# Patient Record
Sex: FEMALE | Race: WHITE | NOT HISPANIC OR LATINO | Employment: FULL TIME | URBAN - METROPOLITAN AREA
[De-identification: names, ages, dates, MRNs, and addresses within clinical notes are randomized per-mention and may not be internally consistent; named-entity substitution may affect disease eponyms.]

---

## 2017-10-19 ENCOUNTER — NON-PROVIDER VISIT (OUTPATIENT)
Dept: OCCUPATIONAL MEDICINE | Facility: CLINIC | Age: 22
End: 2017-10-19

## 2017-10-19 DIAGNOSIS — Z02.1 PRE-EMPLOYMENT DRUG SCREENING: ICD-10-CM

## 2017-10-19 LAB
AMP AMPHETAMINE: NORMAL
COC COCAINE: NORMAL
INT CON NEG: NORMAL
INT CON POS: NORMAL
MET METHAMPHETAMINES: NORMAL
OPI OPIATES: NORMAL
PCP PHENCYCLIDINE: NORMAL
POC DRUG COMMENT 753798-OCCUPATIONAL HEALTH: NORMAL
THC: NORMAL

## 2017-10-19 PROCEDURE — 8911 PR MRO FEE: Performed by: INTERNAL MEDICINE

## 2017-10-19 PROCEDURE — 80305 DRUG TEST PRSMV DIR OPT OBS: CPT | Performed by: PREVENTIVE MEDICINE

## 2017-12-19 ENCOUNTER — APPOINTMENT (OUTPATIENT)
Dept: RADIOLOGY | Facility: IMAGING CENTER | Age: 22
End: 2017-12-19
Attending: PREVENTIVE MEDICINE
Payer: COMMERCIAL

## 2017-12-19 ENCOUNTER — OCCUPATIONAL MEDICINE (OUTPATIENT)
Dept: OCCUPATIONAL MEDICINE | Facility: CLINIC | Age: 22
End: 2017-12-19
Payer: COMMERCIAL

## 2017-12-19 VITALS
BODY MASS INDEX: 24.33 KG/M2 | HEART RATE: 103 BPM | RESPIRATION RATE: 16 BRPM | OXYGEN SATURATION: 100 % | WEIGHT: 155 LBS | SYSTOLIC BLOOD PRESSURE: 134 MMHG | DIASTOLIC BLOOD PRESSURE: 97 MMHG | HEIGHT: 67 IN | TEMPERATURE: 97.8 F

## 2017-12-19 DIAGNOSIS — N92.6 LATE MENSES: ICD-10-CM

## 2017-12-19 DIAGNOSIS — S63.690A: ICD-10-CM

## 2017-12-19 DIAGNOSIS — S63.91XA SPRAIN OF RIGHT HAND, INITIAL ENCOUNTER: ICD-10-CM

## 2017-12-19 LAB
INT CON NEG: NEGATIVE
INT CON POS: POSITIVE
POC URINE PREGNANCY TEST: NORMAL

## 2017-12-19 PROCEDURE — 73130 X-RAY EXAM OF HAND: CPT | Mod: TC,RT,29 | Performed by: NURSE PRACTITIONER

## 2017-12-19 PROCEDURE — 99202 OFFICE O/P NEW SF 15 MIN: CPT | Performed by: PREVENTIVE MEDICINE

## 2017-12-19 RX ORDER — IBUPROFEN 200 MG
400 TABLET ORAL ONCE
OUTPATIENT
Start: 2017-12-19 | End: 2017-12-20

## 2017-12-19 RX ORDER — ACETAMINOPHEN 500 MG
1000 TABLET ORAL ONCE
OUTPATIENT
Start: 2017-12-19 | End: 2017-12-20

## 2017-12-19 ASSESSMENT — PAIN SCALES - GENERAL: PAINLEVEL: 3=SLIGHT PAIN
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
## 2017-12-19 NOTE — LETTER
[facility]  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    Medical Information   Related to Industrial Injury? Yes    Subjective Complaints:  Date of injury 12/19/2017. Mechanism of injury-smashed right index finger. 22-year-old  worker seen for evaluation of right hand injury. She complains mostly of right index finger pain crushed or jammed finger into wheelchair.. She complains of moderate pain over the proximal phalanx.   Objective Findings: Appearance: Well-developed, well-nourished.   Mental Status: Mood and Affect normal. Pleasant. Cooperative. Appropriate.    Musculoskeletal: Right hand shows no swelling, ecchymosis, heat. Tenderness over the IP joints and MCP joints of the right index finger. Good range of motion. Remainder hand exam fairly unremarkable.  Hand x-ray negative   Pre-Existing Condition(s):     Assessment:   Initial Visit    Status: Additional Care Required  Permanent Disability:No    Plan:      Diagnostics:      Comments:       Disability Information   Status: Released to Restricted Duty    From:  12/19/2017  Through: 12/22/2017 Restrictions are:     Physical Restrictions   Sitting:    Standing:    Stooping:    Bending:      Squatting:    Walking:    Climbing:    Pushing:  < or = to 1 hr/day   Pulling:    Other:    Reaching Above Shoulder (L):   Reaching Above Shoulder (R):       Reaching Below Shoulder  (L):    Reaching Below Shoulder (R):      Not to exceed Weight Limits   Carrying(hrs):   Weight Limit(lb):   Lifting(hrs):   Weight  Limit(lb): < or = to 10 pounds   Comments: Limited use of right hand    Repetitive Actions   Hands: i.e. Fine Manipulations from Grasping:     Feet: i.e. Operating Foot Controls:     Driving / Operate Machinery:     Physician Name: Alexandru Menchaca M.D. Physician Signature: ALEXANDRU Pat M.D. e-Signature: Dr. Cristofer Faclon, Medical Director   Clinic Name / Location: [address],   [address] [place] [address] Clinic Phone Number: Dept: [phone]   Appointment Time: 3:00 Pm Visit Start Time: 3:09 PM   Check-In Time:  2:43 Pm Visit Discharge Time:  4:29 PM   Original-Treating Physician or Chiropractor    Page 2-Insurer/TPA    Page 3-Employer    Page 4-Employee

## 2017-12-20 NOTE — PROGRESS NOTES
"Subjective:      Bridger Gomez is a 22 y.o. female who presents with Other (NEW WC DOI 12/19/2017 - Rt Hand - ROOM 3)      Date of injury 12/19/2017. Mechanism of injury-smashed right index finger. 22-year-old  worker seen for evaluation of right hand injury. She complains mostly of right index finger pain crushed or jammed finger into wheelchair.. She complains of moderate pain over the proximal phalanx.     HPI    ROS  Comprehensive medical history form reviewed. Pertinent positives and negatives included in HPI.    PFSH: reviewed in Epic    PMH:  has no past medical history on file.  MEDS:   Current Outpatient Prescriptions:   •  Norgestim-Eth Estrad Triphasic 0.18/0.215/0.25 MG-35 MCG TABS, Take 1 Tab by mouth every day., Disp: , Rfl:   •  tobramycin (TOBREX) 0.3 % SOLN ophthalmic solution, Place 1 Drop in right eye every 4 hours., Disp: 5 mL, Rfl: 0    Current Facility-Administered Medications:   •  ibuprofen (MOTRIN) tablet 400 mg, 400 mg, Oral, Once, Alexandru Menchaca M.D.  •  acetaminophen (TYLENOL) tablet 1,000 mg, 1,000 mg, Oral, Once, Alexandru Menchaca M.D.  ALLERGIES: No Known Allergies  SURGHX: No past surgical history on file.  SOCHX:    Work Status: Employer and Job Title reviewed per [place] C4 form  FH: No pertinent hereditary disorders.        Objective:     /97   Pulse (!) 103   Temp 36.6 °C (97.8 °F)   Resp 16   Ht 1.702 m (5' 7\")   Wt 70.3 kg (155 lb)   SpO2 100%   BMI 24.28 kg/m²      Physical Exam    Appearance: Well-developed, well-nourished.   Mental Status: Mood and Affect normal. Pleasant. Cooperative. Appropriate.    Musculoskeletal: Right hand shows no swelling, ecchymosis, heat. Tenderness over the IP joints and MCP joints of the right index finger. Good range of motion. Remainder hand exam fairly unremarkable.  Hand x-ray negative       Assessment/Plan:     1. Sprain of right hand, initial encounter  2. Other sprain of right index finger  New to Occupational Health " re    - DX-HAND 3+ RIGHT; Future  - ibuprofen (MOTRIN) tablet 400 mg; Take 2 Tabs by mouth Once.  - acetaminophen (TYLENOL) tablet 1,000 mg; Take 2 Tabs by mouth Once.  - Restricted activity  - Recheck in 3 days

## 2017-12-22 ENCOUNTER — OCCUPATIONAL MEDICINE (OUTPATIENT)
Dept: OCCUPATIONAL MEDICINE | Facility: CLINIC | Age: 22
End: 2017-12-22
Payer: COMMERCIAL

## 2017-12-22 VITALS
OXYGEN SATURATION: 98 % | BODY MASS INDEX: 24.33 KG/M2 | DIASTOLIC BLOOD PRESSURE: 88 MMHG | SYSTOLIC BLOOD PRESSURE: 128 MMHG | WEIGHT: 155 LBS | TEMPERATURE: 98.6 F | HEIGHT: 67 IN | HEART RATE: 100 BPM

## 2017-12-22 DIAGNOSIS — S63.690A: ICD-10-CM

## 2017-12-22 DIAGNOSIS — S63.91XA SPRAIN OF RIGHT HAND, INITIAL ENCOUNTER: ICD-10-CM

## 2017-12-22 PROCEDURE — 99212 OFFICE O/P EST SF 10 MIN: CPT | Performed by: PREVENTIVE MEDICINE

## 2017-12-22 ASSESSMENT — PAIN SCALES - GENERAL: PAINLEVEL: NO PAIN

## 2017-12-22 NOTE — LETTER
[address],   [address] [place] [address]  Phone:  [phone] - Fax:  [phone]   [facility] Progress Report and Disability Certification  Date of Service: 12/22/2017   No Show:  No  Date / Time of Next Visit:  Discharged    Claim Information   Patient Name: Bridger Gomez  Claim Number:     Employer: [employer]  Date of Injury: 12/19/2017     Insurer / TPA: [employer] [employer]  ID / SSN:     Occupation: Day hab Specialist  Diagnosis: Diagnoses of Other sprain of right index finger and Sprain of right hand, initial encounter were pertinent to this visit.    Medical Information   Related to Industrial Injury? Yes    Subjective Complaints:  Date of injury 12/19/2017. Mechanism of injury-smashed right index finger. 22-year-old  worker seen for follow-up of right hand injury. She has no complaints.   Objective Findings: Right hand shows no swelling, most heat. No tenderness. Normal .   Pre-Existing Condition(s):     Assessment:   Condition Improved    Status: Discharged /  MMI  Permanent Disability:No    Plan:      Diagnostics:      Comments:       Disability Information   Status: Released to Full Duty    From:  12/22/2017  Through:   Restrictions are:     Physical Restrictions   Sitting:    Standing:    Stooping:    Bending:      Squatting:    Walking:    Climbing:    Pushing:      Pulling:    Other:    Reaching Above Shoulder (L):   Reaching Above Shoulder (R):       Reaching Below Shoulder (L):    Reaching Below Shoulder (R):      Not to exceed Weight Limits   Carrying(hrs):   Weight Limit(lb):   Lifting(hrs):   Weight  Limit(lb):     Comments:      Repetitive Actions   Hands: i.e. Fine Manipulations from Grasping:     Feet: i.e. Operating Foot Controls:     Driving / Operate Machinery:     Physician Name: Alexandru Menchaca M.D. Physician Signature: ALEXANDRU Pat M.D. e-Signature: Dr. Cristofer Falcon, Medical  Director   Clinic Name / Location: [address],   [address]  [place] [address] Clinic Phone Number: Dept: [phone]   Appointment Time: 11:45 Am Visit Start Time: 11:48 AM   Check-In Time:  11:44 Am Visit Discharge Time: 12:20 AM   Original-Treating Physician or Chiropractor    Page 2-Insurer/TPA    Page 3-Employer    Page 4-Employee

## 2017-12-22 NOTE — PROGRESS NOTES
"Subjective:      Bridger Gomez is a 22 y.o. female who presents with Follow-Up (WC DOI 12/19/2017 - Rt Wrist - Rt Hand - Better- Pro Room 1)      Date of injury 12/19/2017. Mechanism of injury-smashed right index finger. 22-year-old  worker seen for follow-up of right hand injury. She has no complaints.     HPI    ROS       Objective:     /88   Pulse 100   Temp 37 °C (98.6 °F)   Ht 1.702 m (5' 7\")   Wt 70.3 kg (155 lb)   SpO2 98%   BMI 24.28 kg/m²      Physical Exam    Right hand shows no swelling, most heat. No tenderness. Normal .       Assessment/Plan:     1. Other sprain of right index finger  2. Sprain of right hand, initial encounter  Condition resolved  Regular work  Release from care      "